# Patient Record
Sex: FEMALE | NOT HISPANIC OR LATINO | Employment: FULL TIME | URBAN - METROPOLITAN AREA
[De-identification: names, ages, dates, MRNs, and addresses within clinical notes are randomized per-mention and may not be internally consistent; named-entity substitution may affect disease eponyms.]

---

## 2023-10-27 ENCOUNTER — APPOINTMENT (OUTPATIENT)
Dept: RADIOLOGY | Facility: MEDICAL CENTER | Age: 53
DRG: 660 | End: 2023-10-27
Attending: UROLOGY
Payer: COMMERCIAL

## 2023-10-27 ENCOUNTER — HOSPITAL ENCOUNTER (OUTPATIENT)
Dept: RADIOLOGY | Facility: MEDICAL CENTER | Age: 53
End: 2023-10-27
Payer: COMMERCIAL

## 2023-10-27 ENCOUNTER — ANESTHESIA EVENT (OUTPATIENT)
Dept: SURGERY | Facility: MEDICAL CENTER | Age: 53
DRG: 660 | End: 2023-10-27
Payer: COMMERCIAL

## 2023-10-27 ENCOUNTER — ANESTHESIA (OUTPATIENT)
Dept: SURGERY | Facility: MEDICAL CENTER | Age: 53
DRG: 660 | End: 2023-10-27
Payer: COMMERCIAL

## 2023-10-27 ENCOUNTER — HOSPITAL ENCOUNTER (INPATIENT)
Facility: MEDICAL CENTER | Age: 53
LOS: 2 days | DRG: 660 | End: 2023-10-29
Attending: STUDENT IN AN ORGANIZED HEALTH CARE EDUCATION/TRAINING PROGRAM | Admitting: STUDENT IN AN ORGANIZED HEALTH CARE EDUCATION/TRAINING PROGRAM
Payer: COMMERCIAL

## 2023-10-27 ENCOUNTER — APPOINTMENT (OUTPATIENT)
Dept: RADIOLOGY | Facility: MEDICAL CENTER | Age: 53
End: 2023-10-27
Payer: COMMERCIAL

## 2023-10-27 DIAGNOSIS — R78.81 BACTEREMIA: ICD-10-CM

## 2023-10-27 PROBLEM — N13.30 HYDRONEPHROSIS: Status: ACTIVE | Noted: 2023-10-27

## 2023-10-27 PROBLEM — N17.9 ACUTE RENAL FAILURE (ARF) (HCC): Status: ACTIVE | Noted: 2023-10-27

## 2023-10-27 LAB
ALBUMIN SERPL BCP-MCNC: 2.1 G/DL (ref 3.2–4.9)
ALBUMIN/GLOB SERPL: 0.6 G/DL
ALP SERPL-CCNC: 302 U/L (ref 30–99)
ALT SERPL-CCNC: 21 U/L (ref 2–50)
ANION GAP SERPL CALC-SCNC: 10 MMOL/L (ref 7–16)
ANISOCYTOSIS BLD QL SMEAR: ABNORMAL
APPEARANCE UR: CLEAR
AST SERPL-CCNC: 30 U/L (ref 12–45)
BACTERIA #/AREA URNS HPF: NEGATIVE /HPF
BASOPHILS # BLD AUTO: 0 % (ref 0–1.8)
BASOPHILS # BLD: 0 K/UL (ref 0–0.12)
BILIRUB SERPL-MCNC: 2.6 MG/DL (ref 0.1–1.5)
BILIRUB UR QL STRIP.AUTO: NEGATIVE
BUN SERPL-MCNC: 16 MG/DL (ref 8–22)
CALCIUM ALBUM COR SERPL-MCNC: 9.6 MG/DL (ref 8.5–10.5)
CALCIUM SERPL-MCNC: 8.1 MG/DL (ref 8.5–10.5)
CHLORIDE SERPL-SCNC: 107 MMOL/L (ref 96–112)
CO2 SERPL-SCNC: 18 MMOL/L (ref 20–33)
COLOR UR: ABNORMAL
CREAT SERPL-MCNC: 1.54 MG/DL (ref 0.5–1.4)
EOSINOPHIL # BLD AUTO: 0 K/UL (ref 0–0.51)
EOSINOPHIL NFR BLD: 0 % (ref 0–6.9)
EPI CELLS #/AREA URNS HPF: NEGATIVE /HPF
ERYTHROCYTE [DISTWIDTH] IN BLOOD BY AUTOMATED COUNT: 47.8 FL (ref 35.9–50)
GFR SERPLBLD CREATININE-BSD FMLA CKD-EPI: 40 ML/MIN/1.73 M 2
GLOBULIN SER CALC-MCNC: 3.7 G/DL (ref 1.9–3.5)
GLUCOSE SERPL-MCNC: 111 MG/DL (ref 65–99)
GLUCOSE UR STRIP.AUTO-MCNC: NEGATIVE MG/DL
HCT VFR BLD AUTO: 29.3 % (ref 37–47)
HGB BLD-MCNC: 9.7 G/DL (ref 12–16)
HYALINE CASTS #/AREA URNS LPF: ABNORMAL /LPF
KETONES UR STRIP.AUTO-MCNC: NEGATIVE MG/DL
LEUKOCYTE ESTERASE UR QL STRIP.AUTO: ABNORMAL
LYMPHOCYTES # BLD AUTO: 2.1 K/UL (ref 1–4.8)
LYMPHOCYTES NFR BLD: 13.9 % (ref 22–41)
MANUAL DIFF BLD: NORMAL
MCH RBC QN AUTO: 27.4 PG (ref 27–33)
MCHC RBC AUTO-ENTMCNC: 33.1 G/DL (ref 32.2–35.5)
MCV RBC AUTO: 82.8 FL (ref 81.4–97.8)
METAMYELOCYTES NFR BLD MANUAL: 3.3 %
MICRO URNS: ABNORMAL
MONOCYTES # BLD AUTO: 0.12 K/UL (ref 0–0.85)
MONOCYTES NFR BLD AUTO: 0.8 % (ref 0–13.4)
MORPHOLOGY BLD-IMP: NORMAL
MYELOCYTES NFR BLD MANUAL: 3.3 %
NEUTROPHILS # BLD AUTO: 11.76 K/UL (ref 1.82–7.42)
NEUTROPHILS NFR BLD: 73.8 % (ref 44–72)
NEUTS BAND NFR BLD MANUAL: 4.1 % (ref 0–10)
NITRITE UR QL STRIP.AUTO: NEGATIVE
NRBC # BLD AUTO: 0 K/UL
NRBC BLD-RTO: 0 /100 WBC (ref 0–0.2)
PH UR STRIP.AUTO: 6 [PH] (ref 5–8)
PLATELET # BLD AUTO: 178 K/UL (ref 164–446)
PLATELET BLD QL SMEAR: NORMAL
PMV BLD AUTO: 9.4 FL (ref 9–12.9)
POTASSIUM SERPL-SCNC: 3.4 MMOL/L (ref 3.6–5.5)
PROMYELOCYTES NFR BLD MANUAL: 0.8 %
PROT SERPL-MCNC: 5.8 G/DL (ref 6–8.2)
PROT UR QL STRIP: 30 MG/DL
RBC # BLD AUTO: 3.54 M/UL (ref 4.2–5.4)
RBC # URNS HPF: >150 /HPF
RBC BLD AUTO: PRESENT
RBC UR QL AUTO: ABNORMAL
SODIUM SERPL-SCNC: 135 MMOL/L (ref 135–145)
SP GR UR STRIP.AUTO: 1.01
TOXIC GRANULES BLD QL SMEAR: NORMAL
UROBILINOGEN UR STRIP.AUTO-MCNC: 1 MG/DL
WBC # BLD AUTO: 15.1 K/UL (ref 4.8–10.8)
WBC #/AREA URNS HPF: ABNORMAL /HPF

## 2023-10-27 PROCEDURE — 0T768DZ DILATION OF RIGHT URETER WITH INTRALUMINAL DEVICE, VIA NATURAL OR ARTIFICIAL OPENING ENDOSCOPIC: ICD-10-PCS | Performed by: ANESTHESIOLOGY

## 2023-10-27 PROCEDURE — 160035 HCHG PACU - 1ST 60 MINS PHASE I: Performed by: UROLOGY

## 2023-10-27 PROCEDURE — 160028 HCHG SURGERY MINUTES - 1ST 30 MINS LEVEL 3: Performed by: UROLOGY

## 2023-10-27 PROCEDURE — 99222 1ST HOSP IP/OBS MODERATE 55: CPT | Performed by: STUDENT IN AN ORGANIZED HEALTH CARE EDUCATION/TRAINING PROGRAM

## 2023-10-27 PROCEDURE — 160039 HCHG SURGERY MINUTES - EA ADDL 1 MIN LEVEL 3: Performed by: UROLOGY

## 2023-10-27 PROCEDURE — 700105 HCHG RX REV CODE 258: Performed by: ANESTHESIOLOGY

## 2023-10-27 PROCEDURE — C2617 STENT, NON-COR, TEM W/O DEL: HCPCS | Performed by: UROLOGY

## 2023-10-27 PROCEDURE — 160002 HCHG RECOVERY MINUTES (STAT): Performed by: UROLOGY

## 2023-10-27 PROCEDURE — 160048 HCHG OR STATISTICAL LEVEL 1-5: Performed by: UROLOGY

## 2023-10-27 PROCEDURE — 770006 HCHG ROOM/CARE - MED/SURG/GYN SEMI*

## 2023-10-27 PROCEDURE — 87086 URINE CULTURE/COLONY COUNT: CPT

## 2023-10-27 PROCEDURE — 700102 HCHG RX REV CODE 250 W/ 637 OVERRIDE(OP): Performed by: STUDENT IN AN ORGANIZED HEALTH CARE EDUCATION/TRAINING PROGRAM

## 2023-10-27 PROCEDURE — 700111 HCHG RX REV CODE 636 W/ 250 OVERRIDE (IP): Mod: JZ | Performed by: ANESTHESIOLOGY

## 2023-10-27 PROCEDURE — 160009 HCHG ANES TIME/MIN: Performed by: UROLOGY

## 2023-10-27 PROCEDURE — 87077 CULTURE AEROBIC IDENTIFY: CPT

## 2023-10-27 PROCEDURE — 87040 BLOOD CULTURE FOR BACTERIA: CPT | Mod: 91

## 2023-10-27 PROCEDURE — 700101 HCHG RX REV CODE 250: Performed by: ANESTHESIOLOGY

## 2023-10-27 PROCEDURE — 700117 HCHG RX CONTRAST REV CODE 255: Performed by: UROLOGY

## 2023-10-27 PROCEDURE — 87150 DNA/RNA AMPLIFIED PROBE: CPT | Mod: 91

## 2023-10-27 PROCEDURE — C1769 GUIDE WIRE: HCPCS | Performed by: UROLOGY

## 2023-10-27 PROCEDURE — 36415 COLL VENOUS BLD VENIPUNCTURE: CPT

## 2023-10-27 PROCEDURE — 85007 BL SMEAR W/DIFF WBC COUNT: CPT

## 2023-10-27 PROCEDURE — 81001 URINALYSIS AUTO W/SCOPE: CPT

## 2023-10-27 PROCEDURE — 85027 COMPLETE CBC AUTOMATED: CPT

## 2023-10-27 PROCEDURE — A9270 NON-COVERED ITEM OR SERVICE: HCPCS | Performed by: STUDENT IN AN ORGANIZED HEALTH CARE EDUCATION/TRAINING PROGRAM

## 2023-10-27 PROCEDURE — 80053 COMPREHEN METABOLIC PANEL: CPT

## 2023-10-27 DEVICE — STENT UROLOGICAL POLARIS 6X26  ULTRA: Type: IMPLANTABLE DEVICE | Site: URETER | Status: FUNCTIONAL

## 2023-10-27 RX ORDER — ACETAMINOPHEN 325 MG/1
650 TABLET ORAL EVERY 6 HOURS
Status: DISCONTINUED | OUTPATIENT
Start: 2023-10-27 | End: 2023-10-29 | Stop reason: HOSPADM

## 2023-10-27 RX ORDER — ACETAMINOPHEN 325 MG/1
650 TABLET ORAL EVERY 6 HOURS PRN
Status: DISCONTINUED | OUTPATIENT
Start: 2023-11-01 | End: 2023-10-29 | Stop reason: HOSPADM

## 2023-10-27 RX ORDER — DIPHENHYDRAMINE HYDROCHLORIDE 50 MG/ML
12.5 INJECTION INTRAMUSCULAR; INTRAVENOUS
Status: DISCONTINUED | OUTPATIENT
Start: 2023-10-27 | End: 2023-10-27 | Stop reason: HOSPADM

## 2023-10-27 RX ORDER — HYDROMORPHONE HYDROCHLORIDE 1 MG/ML
0.1 INJECTION, SOLUTION INTRAMUSCULAR; INTRAVENOUS; SUBCUTANEOUS
Status: DISCONTINUED | OUTPATIENT
Start: 2023-10-27 | End: 2023-10-27 | Stop reason: HOSPADM

## 2023-10-27 RX ORDER — OXYCODONE HYDROCHLORIDE 5 MG/1
5 TABLET ORAL
Status: DISCONTINUED | OUTPATIENT
Start: 2023-10-27 | End: 2023-10-29 | Stop reason: HOSPADM

## 2023-10-27 RX ORDER — LIDOCAINE HYDROCHLORIDE 20 MG/ML
INJECTION, SOLUTION EPIDURAL; INFILTRATION; INTRACAUDAL; PERINEURAL PRN
Status: DISCONTINUED | OUTPATIENT
Start: 2023-10-27 | End: 2023-10-27 | Stop reason: SURG

## 2023-10-27 RX ORDER — SODIUM CHLORIDE, SODIUM LACTATE, POTASSIUM CHLORIDE, CALCIUM CHLORIDE 600; 310; 30; 20 MG/100ML; MG/100ML; MG/100ML; MG/100ML
INJECTION, SOLUTION INTRAVENOUS CONTINUOUS
Status: DISCONTINUED | OUTPATIENT
Start: 2023-10-27 | End: 2023-10-27 | Stop reason: HOSPADM

## 2023-10-27 RX ORDER — SODIUM CHLORIDE, SODIUM LACTATE, POTASSIUM CHLORIDE, CALCIUM CHLORIDE 600; 310; 30; 20 MG/100ML; MG/100ML; MG/100ML; MG/100ML
INJECTION, SOLUTION INTRAVENOUS
Status: DISCONTINUED | OUTPATIENT
Start: 2023-10-27 | End: 2023-10-27 | Stop reason: SURG

## 2023-10-27 RX ORDER — HYDROMORPHONE HYDROCHLORIDE 1 MG/ML
0.5 INJECTION, SOLUTION INTRAMUSCULAR; INTRAVENOUS; SUBCUTANEOUS
Status: DISCONTINUED | OUTPATIENT
Start: 2023-10-27 | End: 2023-10-29 | Stop reason: HOSPADM

## 2023-10-27 RX ORDER — IPRATROPIUM BROMIDE AND ALBUTEROL SULFATE 2.5; .5 MG/3ML; MG/3ML
3 SOLUTION RESPIRATORY (INHALATION)
Status: DISCONTINUED | OUTPATIENT
Start: 2023-10-27 | End: 2023-10-27 | Stop reason: HOSPADM

## 2023-10-27 RX ORDER — ONDANSETRON 2 MG/ML
INJECTION INTRAMUSCULAR; INTRAVENOUS PRN
Status: DISCONTINUED | OUTPATIENT
Start: 2023-10-27 | End: 2023-10-27 | Stop reason: SURG

## 2023-10-27 RX ORDER — MIDAZOLAM HYDROCHLORIDE 1 MG/ML
1 INJECTION INTRAMUSCULAR; INTRAVENOUS
Status: DISCONTINUED | OUTPATIENT
Start: 2023-10-27 | End: 2023-10-27 | Stop reason: HOSPADM

## 2023-10-27 RX ORDER — HYDROMORPHONE HYDROCHLORIDE 1 MG/ML
0.5 INJECTION, SOLUTION INTRAMUSCULAR; INTRAVENOUS; SUBCUTANEOUS
Status: DISCONTINUED | OUTPATIENT
Start: 2023-10-27 | End: 2023-10-27 | Stop reason: HOSPADM

## 2023-10-27 RX ORDER — DEXAMETHASONE SODIUM PHOSPHATE 4 MG/ML
INJECTION, SOLUTION INTRA-ARTICULAR; INTRALESIONAL; INTRAMUSCULAR; INTRAVENOUS; SOFT TISSUE PRN
Status: DISCONTINUED | OUTPATIENT
Start: 2023-10-27 | End: 2023-10-27 | Stop reason: SURG

## 2023-10-27 RX ORDER — OXYCODONE HCL 5 MG/5 ML
5 SOLUTION, ORAL ORAL
Status: DISCONTINUED | OUTPATIENT
Start: 2023-10-27 | End: 2023-10-27 | Stop reason: HOSPADM

## 2023-10-27 RX ORDER — OXYCODONE HCL 5 MG/5 ML
10 SOLUTION, ORAL ORAL
Status: DISCONTINUED | OUTPATIENT
Start: 2023-10-27 | End: 2023-10-27 | Stop reason: HOSPADM

## 2023-10-27 RX ORDER — ONDANSETRON 2 MG/ML
4 INJECTION INTRAMUSCULAR; INTRAVENOUS
Status: DISCONTINUED | OUTPATIENT
Start: 2023-10-27 | End: 2023-10-27 | Stop reason: HOSPADM

## 2023-10-27 RX ORDER — OXYCODONE HYDROCHLORIDE 10 MG/1
10 TABLET ORAL
Status: DISCONTINUED | OUTPATIENT
Start: 2023-10-27 | End: 2023-10-29 | Stop reason: HOSPADM

## 2023-10-27 RX ORDER — MEPERIDINE HYDROCHLORIDE 25 MG/ML
25 INJECTION INTRAMUSCULAR; INTRAVENOUS; SUBCUTANEOUS
Status: DISCONTINUED | OUTPATIENT
Start: 2023-10-27 | End: 2023-10-27 | Stop reason: HOSPADM

## 2023-10-27 RX ORDER — HYDROMORPHONE HYDROCHLORIDE 1 MG/ML
0.2 INJECTION, SOLUTION INTRAMUSCULAR; INTRAVENOUS; SUBCUTANEOUS
Status: DISCONTINUED | OUTPATIENT
Start: 2023-10-27 | End: 2023-10-27 | Stop reason: HOSPADM

## 2023-10-27 RX ORDER — PHENYLEPHRINE HYDROCHLORIDE 10 MG/ML
INJECTION, SOLUTION INTRAMUSCULAR; INTRAVENOUS; SUBCUTANEOUS PRN
Status: DISCONTINUED | OUTPATIENT
Start: 2023-10-27 | End: 2023-10-27 | Stop reason: SURG

## 2023-10-27 RX ADMIN — SUGAMMADEX 200 MG: 100 INJECTION, SOLUTION INTRAVENOUS at 17:55

## 2023-10-27 RX ADMIN — FENTANYL CITRATE 50 MCG: 50 INJECTION, SOLUTION INTRAMUSCULAR; INTRAVENOUS at 17:30

## 2023-10-27 RX ADMIN — PROPOFOL 150 MG: 10 INJECTION, EMULSION INTRAVENOUS at 17:13

## 2023-10-27 RX ADMIN — MIDAZOLAM HYDROCHLORIDE 2 MG: 2 INJECTION, SOLUTION INTRAMUSCULAR; INTRAVENOUS at 17:08

## 2023-10-27 RX ADMIN — ROCURONIUM BROMIDE 50 MG: 10 INJECTION, SOLUTION INTRAVENOUS at 17:13

## 2023-10-27 RX ADMIN — LIDOCAINE HYDROCHLORIDE 40 MG: 20 INJECTION, SOLUTION EPIDURAL; INFILTRATION; INTRACAUDAL at 17:13

## 2023-10-27 RX ADMIN — PHENYLEPHRINE HYDROCHLORIDE 200 MCG: 10 INJECTION INTRAVENOUS at 17:37

## 2023-10-27 RX ADMIN — FENTANYL CITRATE 50 MCG: 50 INJECTION, SOLUTION INTRAMUSCULAR; INTRAVENOUS at 17:13

## 2023-10-27 RX ADMIN — SODIUM CHLORIDE, POTASSIUM CHLORIDE, SODIUM LACTATE AND CALCIUM CHLORIDE: 600; 310; 30; 20 INJECTION, SOLUTION INTRAVENOUS at 17:08

## 2023-10-27 RX ADMIN — ONDANSETRON 4 MG: 2 INJECTION INTRAMUSCULAR; INTRAVENOUS at 17:23

## 2023-10-27 RX ADMIN — OXYCODONE HYDROCHLORIDE 10 MG: 10 TABLET ORAL at 15:34

## 2023-10-27 RX ADMIN — DEXAMETHASONE SODIUM PHOSPHATE 8 MG: 4 INJECTION INTRA-ARTICULAR; INTRALESIONAL; INTRAMUSCULAR; INTRAVENOUS; SOFT TISSUE at 17:23

## 2023-10-27 ASSESSMENT — ENCOUNTER SYMPTOMS
HEADACHES: 0
SORE THROAT: 0
NECK PAIN: 0
CHILLS: 0
COUGH: 0
DIARRHEA: 1
PALPITATIONS: 0
SHORTNESS OF BREATH: 0
MYALGIAS: 0
DOUBLE VISION: 0
HEARTBURN: 0
DIZZINESS: 0
NAUSEA: 0
FLANK PAIN: 1
BLURRED VISION: 0
FEVER: 0

## 2023-10-27 ASSESSMENT — PAIN DESCRIPTION - PAIN TYPE
TYPE: ACUTE PAIN

## 2023-10-27 ASSESSMENT — PAIN SCALES - GENERAL: PAIN_LEVEL: 3

## 2023-10-27 NOTE — PROGRESS NOTES
[facility] HOSPITAL[facility] TRIAGE OFFICER DIRECT ADMISSION REPORT  Transferring facility: [facility]  Transferring physician: Dr. Paulino  Chief complaint: Right flank pain  Pertinent history & patient course: Otherwise healthy 54 y/o woman p/w RUQ/R flank pain 4 days ago. Found to have AYAKA with creatinine 3.6.  Admitted to outside hospital, treated with IV fluids and started on Zosyn.  CT of the abdomen showed right-sided kidney stone measuring 5 x 7 mm with hydronephrosis, as well as questionable cholecystitis.  She was evaluated by surgery and had a negative MRCP, finding of possible cholecystitis felt to be incidental.  Initially improved, creatinine improved to 1.7.  However subsequently began having increasing right flank pain and increasing leukocytosis with WBC increasing from 9 to 13-15.  Abdomen was reimaged initially by ultrasound which did not show any stones or hydronephrosis, then subsequently had a CT scan which showed right hydronephrosis with kidney stone measuring 6 x 8 x 4 mm.  Blood cultures were positive for Klebsiella, Zosyn was de-escalated to ceftriaxone.  Transfer requested for urology needs.    Code Status: Full code  Further work up or recommendations per triage officer prior to transfer: None  Consultants called prior to transfer and pertinent input from consultants: None  Patient accepted for transfer: Yes  Consultants to be called upon arrival: Urology  Admission status: Inpatient.   Floor requested: Surgery  If ICU transfer, name of intensivist case discussed with and pertinent input from critical care: N/A     Please inform the triage officer upon arrival of the patient to [facility] for assignment of a hospitalist to perform admission.      For any question or concerns regarding the care of this patient, please reach out to the assigned hospita

## 2023-10-27 NOTE — H&P
Hospital Medicine History & Physical Note    Date of Service  10/27/2023    Primary Care Physician  No primary care provider on file.    Consultants  infectious disease and urology    Specialist Names: Dr. Tabares, Dr. Matos    Code Status  Full Code    Chief Complaint  No chief complaint on file.      History of Presenting Illness  Ingrid Bartlett is a 53 y.o. female who presented 10/27/2023 with flank pain, kidney stone. Otherwise healthy 52 y/o woman p/w RUQ/R flank pain 4 days ago. Found to have AYAKA with creatinine 3.6.  Admitted to outside hospital, treated with IV fluids and started on Zosyn.  CT of the abdomen showed right-sided kidney stone measuring 5 x 7 mm with hydronephrosis, as well as questionable cholecystitis.  She was evaluated by surgery and had a negative MRCP, finding of possible cholecystitis felt to be incidental.  Initially improved, creatinine improved to 1.7.  However subsequently began having increasing right flank pain and increasing leukocytosis with WBC increasing from 9 to 13-15.  Abdomen was reimaged initially by ultrasound which did not show any stones or hydronephrosis, then subsequently had a CT scan which showed right hydronephrosis with kidney stone measuring 6 x 8 x 4 mm.  Blood cultures were positive for Klebsiella, Zosyn was de-escalated to ceftriaxone.  Transfer requested for urology needs.    Labs reviewed from outside hospital:  27  WBC 15.8  Hemoglobin 9.2  Glucose 100  BUN 20  Creatinine 1.7  Sodium 136  Potassium 3.3  Bicarb 18    I discussed the plan of care with patient.    I consulted urology and infectious disease.    Review of Systems  Review of Systems   Constitutional:  Positive for malaise/fatigue. Negative for chills and fever.   HENT:  Negative for congestion and sore throat.    Eyes:  Negative for blurred vision and double vision.   Respiratory:  Negative for cough and shortness of breath.    Cardiovascular:  Negative for chest pain and palpitations.    Gastrointestinal:  Positive for diarrhea. Negative for heartburn and nausea.   Genitourinary:  Positive for flank pain.   Musculoskeletal:  Negative for myalgias and neck pain.   Neurological:  Negative for dizziness and headaches.       Past Medical History   has no past medical history on file.    Surgical History   has no past surgical history on file.     Family History  family history is not on file.   Family history reviewed with patient. There is no family history that is pertinent to the chief complaint.     Social History       Allergies  No Known Allergies    Medications  None       Physical Exam  Temp:  [36.8 °C (98.3 °F)-37.2 °C (98.9 °F)] 37.1 °C (98.7 °F)  Pulse:  [84-87] 87  Resp:  [16-18] 16  BP: (146-160)/(81-85) 160/81  SpO2:  [94 %-95 %] 94 %                          Physical Exam  Constitutional:       General: She is not in acute distress.     Appearance: She is not toxic-appearing.   HENT:      Head: Normocephalic and atraumatic.      Nose: Nose normal.      Mouth/Throat:      Mouth: Mucous membranes are dry.      Pharynx: Oropharynx is clear.   Eyes:      Extraocular Movements: Extraocular movements intact.      Conjunctiva/sclera: Conjunctivae normal.   Cardiovascular:      Rate and Rhythm: Normal rate and regular rhythm.      Pulses: Normal pulses.      Heart sounds: Normal heart sounds.   Pulmonary:      Effort: Pulmonary effort is normal.      Breath sounds: Normal breath sounds.   Abdominal:      Palpations: Abdomen is soft.      Tenderness: There is right CVA tenderness.   Musculoskeletal:         General: No swelling or deformity.      Cervical back: Neck supple. No rigidity.   Skin:     General: Skin is warm and dry.   Neurological:      General: No focal deficit present.      Mental Status: She is oriented to person, place, and time.         Laboratory:  Recent Labs     10/27/23  1452   WBC 15.1*   RBC 3.54*   HEMOGLOBIN 9.7*   HEMATOCRIT 29.3*   MCV 82.8   MCH 27.4   MCHC 33.1  "  RDW 47.8   PLATELETCT 178   MPV 9.4     Recent Labs     10/27/23  1452   SODIUM 135   POTASSIUM 3.4*   CHLORIDE 107   CO2 18*   GLUCOSE 111*   BUN 16   CREATININE 1.54*   CALCIUM 8.1*     Recent Labs     10/27/23  1452   ALTSGPT 21   ASTSGOT 30   ALKPHOSPHAT 302*   TBILIRUBIN 2.6*   GLUCOSE 111*         No results for input(s): \"NTPROBNP\" in the last 72 hours.      No results for input(s): \"TROPONINT\" in the last 72 hours.    Imaging:  MR-FOREIGN FILM MRI   Final Result      DX-CYSTO FLUORO > 1 HOUR    (Results Pending)       Assessment/Plan:  Justification for Admission Status  I anticipate this patient will require at least two midnights for appropriate medical management, necessitating inpatient admission because infected kidney stones and bacteremia    Patient will need a Med/Surg bed on SURGICAL service .  The need is secondary to above.    * Hydronephrosis- (present on admission)  Assessment & Plan  With associated right-sided kidney stone and urinary tract infection  Urology consulted, Dr. Matos will see patient, likely plan for or today  Continue antibiotics  ID consulted for Klebsiella bacteremia      Acute renal failure (ARF) ([facility])  Assessment & Plan  In setting of nephrolithiasis, hydronephrosis UTI and bacteremia  I have ordered urinalysis  Improved since admission to outside hospital 4 days ago  Continue to monitor kidney function  Avoid nephrotoxins, renally dose all meds      Bacteremia  Assessment & Plan  Klebsiella growing on blood culture at outside hospital  Source is urine/kidney stone  Have redrawn blood cultures here  ID consulted, continue ceftriaxone        VTE prophylaxis: SCDs/TEDs  "

## 2023-10-27 NOTE — CONSULTS
"Urology consult note    Reason for consultation: right ureteral stone, sepsis    HPI:   Presented to [place] with right flank pain and fever. CT found to have right ureteral stone. Now also with Klebsiella bacteremia    O:   BP (!) 146/85   Pulse 86   Temp 37.2 °C (98.9 °F) (Temporal)   Resp 16   Ht 1.651 m (5' 5\")   Wt 85.8 kg (189 lb 2.5 oz)   SpO2 95%   Recent Labs     10/27/23  1452   SODIUM 135   POTASSIUM 3.4*   CHLORIDE 107   CO2 18*   GLUCOSE 111*   BUN 16   CREATININE 1.54*   CALCIUM 8.1*     Recent Labs     10/27/23  1452   WBC 15.1*   RBC 3.54*   HEMOGLOBIN 9.7*   HEMATOCRIT 29.3*   MCV 82.8   MCH 27.4   MCHC 33.1   RDW 47.8   PLATELETCT 178   MPV 9.4         Exam:   Awake, alert, mild distress   Abdomen soft, benign.   Urine: clear      A/P:    Active Hospital Problems    Diagnosis     Hydronephrosis [N13.30]        Gaurded   New recommendations: cystoscopy right ureteral stent. Once sepsis resolves, plan for ureteroscopy, laser lithotripsy 2-3 weeks      "

## 2023-10-27 NOTE — CARE PLAN
Problem: Knowledge Deficit - Standard  Goal: Patient and family/care givers will demonstrate understanding of plan of care, disease process/condition, diagnostic tests and medications  Outcome: Progressing   The patient is Stable - Low risk of patient condition declining or worsening    Shift Goals  Clinical Goals: surgery  Patient Goals: Surgery    Progress made toward(s) clinical / shift goals:  Patient to surgery.    Patient is not progressing towards the following goals:

## 2023-10-27 NOTE — ANESTHESIA PREPROCEDURE EVALUATION
Case: [id number] Date/Time: 10/27/23 1640    Procedure: CYSTOSCOPY, WITH URETERAL STENT INSERTION (Right)    Location: [facility] OR 18 / SURGERY [facility]    Surgeons: Rui Matos M.D.          Relevant Problems      (positive) Hydronephrosis       Physical Exam    Airway   Mallampati: II  TM distance: >3 FB  Neck ROM: full       Cardiovascular - normal exam  Rhythm: regular  Rate: normal  (-) murmur     Dental - normal exam           Pulmonary - normal exam  Breath sounds clear to auscultation     Abdominal    Neurological - normal exam                 Anesthesia Plan    ASA 2- EMERGENT       Plan - general       Airway plan will be ETT          Induction: intravenous    Postoperative Plan: Postoperative administration of opioids is intended.    Pertinent diagnostic labs and testing reviewed    Informed Consent:    Anesthetic plan and risks discussed with patient.    Use of blood products discussed with: patient whom consented to blood products.

## 2023-10-27 NOTE — ASSESSMENT & PLAN NOTE
With associated right-sided kidney stone and urinary tract infection  Urology consulted, s/p cystoscopy with ureteral stent placement on 10/27.   Urology rec outpatient CULTS in 2-3 weeks once infection has resolved.  Continue iv ceftriaxone     yes

## 2023-10-28 PROBLEM — E87.1 HYPONATREMIA: Status: ACTIVE | Noted: 2023-10-28

## 2023-10-28 LAB
ALBUMIN SERPL BCP-MCNC: 2.2 G/DL (ref 3.2–4.9)
ALBUMIN/GLOB SERPL: 0.6 G/DL
ALP SERPL-CCNC: 280 U/L (ref 30–99)
ALT SERPL-CCNC: 16 U/L (ref 2–50)
ANION GAP SERPL CALC-SCNC: 10 MMOL/L (ref 7–16)
AST SERPL-CCNC: 16 U/L (ref 12–45)
BILIRUB SERPL-MCNC: 1.5 MG/DL (ref 0.1–1.5)
BUN SERPL-MCNC: 21 MG/DL (ref 8–22)
CALCIUM ALBUM COR SERPL-MCNC: 9.5 MG/DL (ref 8.5–10.5)
CALCIUM SERPL-MCNC: 8.1 MG/DL (ref 8.5–10.5)
CHLORIDE SERPL-SCNC: 103 MMOL/L (ref 96–112)
CO2 SERPL-SCNC: 20 MMOL/L (ref 20–33)
CREAT SERPL-MCNC: 1.22 MG/DL (ref 0.5–1.4)
ERYTHROCYTE [DISTWIDTH] IN BLOOD BY AUTOMATED COUNT: 48 FL (ref 35.9–50)
GFR SERPLBLD CREATININE-BSD FMLA CKD-EPI: 52 ML/MIN/1.73 M 2
GLOBULIN SER CALC-MCNC: 3.7 G/DL (ref 1.9–3.5)
GLUCOSE SERPL-MCNC: 121 MG/DL (ref 65–99)
HCT VFR BLD AUTO: 29.3 % (ref 37–47)
HGB BLD-MCNC: 9.7 G/DL (ref 12–16)
MCH RBC QN AUTO: 27.3 PG (ref 27–33)
MCHC RBC AUTO-ENTMCNC: 33.1 G/DL (ref 32.2–35.5)
MCV RBC AUTO: 82.5 FL (ref 81.4–97.8)
PLATELET # BLD AUTO: 218 K/UL (ref 164–446)
PMV BLD AUTO: 9.6 FL (ref 9–12.9)
POTASSIUM SERPL-SCNC: 4 MMOL/L (ref 3.6–5.5)
PROT SERPL-MCNC: 5.9 G/DL (ref 6–8.2)
RBC # BLD AUTO: 3.55 M/UL (ref 4.2–5.4)
SODIUM SERPL-SCNC: 133 MMOL/L (ref 135–145)
WBC # BLD AUTO: 17.7 K/UL (ref 4.8–10.8)

## 2023-10-28 PROCEDURE — 36415 COLL VENOUS BLD VENIPUNCTURE: CPT

## 2023-10-28 PROCEDURE — A9270 NON-COVERED ITEM OR SERVICE: HCPCS | Performed by: STUDENT IN AN ORGANIZED HEALTH CARE EDUCATION/TRAINING PROGRAM

## 2023-10-28 PROCEDURE — 99223 1ST HOSP IP/OBS HIGH 75: CPT | Performed by: INTERNAL MEDICINE

## 2023-10-28 PROCEDURE — 80053 COMPREHEN METABOLIC PANEL: CPT

## 2023-10-28 PROCEDURE — 770006 HCHG ROOM/CARE - MED/SURG/GYN SEMI*

## 2023-10-28 PROCEDURE — 700111 HCHG RX REV CODE 636 W/ 250 OVERRIDE (IP): Performed by: STUDENT IN AN ORGANIZED HEALTH CARE EDUCATION/TRAINING PROGRAM

## 2023-10-28 PROCEDURE — 700102 HCHG RX REV CODE 250 W/ 637 OVERRIDE(OP): Performed by: STUDENT IN AN ORGANIZED HEALTH CARE EDUCATION/TRAINING PROGRAM

## 2023-10-28 PROCEDURE — 99233 SBSQ HOSP IP/OBS HIGH 50: CPT | Performed by: STUDENT IN AN ORGANIZED HEALTH CARE EDUCATION/TRAINING PROGRAM

## 2023-10-28 PROCEDURE — 85027 COMPLETE CBC AUTOMATED: CPT

## 2023-10-28 RX ORDER — AMOXICILLIN 250 MG
2 CAPSULE ORAL 2 TIMES DAILY
Status: DISCONTINUED | OUTPATIENT
Start: 2023-10-28 | End: 2023-10-29 | Stop reason: HOSPADM

## 2023-10-28 RX ORDER — POLYETHYLENE GLYCOL 3350 17 G/17G
1 POWDER, FOR SOLUTION ORAL
Status: DISCONTINUED | OUTPATIENT
Start: 2023-10-28 | End: 2023-10-29 | Stop reason: HOSPADM

## 2023-10-28 RX ORDER — BISACODYL 10 MG
10 SUPPOSITORY, RECTAL RECTAL
Status: DISCONTINUED | OUTPATIENT
Start: 2023-10-28 | End: 2023-10-29 | Stop reason: HOSPADM

## 2023-10-28 RX ADMIN — CEFTRIAXONE SODIUM 2000 MG: 10 INJECTION, POWDER, FOR SOLUTION INTRAVENOUS at 05:15

## 2023-10-28 RX ADMIN — ACETAMINOPHEN 650 MG: 325 TABLET, FILM COATED ORAL at 18:05

## 2023-10-28 RX ADMIN — ACETAMINOPHEN 650 MG: 325 TABLET, FILM COATED ORAL at 23:26

## 2023-10-28 ASSESSMENT — ENCOUNTER SYMPTOMS
CHILLS: 0
NAUSEA: 0
NERVOUS/ANXIOUS: 0
FEVER: 0
ABDOMINAL PAIN: 0
DIARRHEA: 0
SHORTNESS OF BREATH: 0
MYALGIAS: 0
SPUTUM PRODUCTION: 0
VOMITING: 0
FOCAL WEAKNESS: 0
BLURRED VISION: 0
COUGH: 0
FLANK PAIN: 0
CONSTIPATION: 0

## 2023-10-28 ASSESSMENT — PAIN DESCRIPTION - PAIN TYPE
TYPE: ACUTE PAIN

## 2023-10-28 ASSESSMENT — FIBROSIS 4 INDEX: FIB4 SCORE: 1.01

## 2023-10-28 NOTE — PROGRESS NOTES
lab from Lab called with critical result of blood cultures:update on pcr: mrsa and staph aureus negative at 1135. Critical lab result read back to lab.   Dr. vincent notified of critical lab result at 1138.  Critical lab result read back by Dr. vincent.

## 2023-10-28 NOTE — ANESTHESIA TIME REPORT
Anesthesia Start and Stop Event Times     Date Time Event    10/27/2023 1621 Ready for Procedure     1708 Anesthesia Start     1809 Anesthesia Stop        Responsible Staff  10/27/23    Name Role Begin End    Ernesto Singh M.D. Anesth 1708 1809        Overtime Reason:  no overtime (within assigned shift)    Comments:

## 2023-10-28 NOTE — ASSESSMENT & PLAN NOTE
In setting of nephrolithiasis, hydronephrosis UTI and bacteremia  I have ordered urinalysis  Improved since admission to outside hospital 4 days ago  Continue to monitor kidney function  Avoid nephrotoxins, renally dose all meds  Improving

## 2023-10-28 NOTE — ANESTHESIA PROCEDURE NOTES
Airway    Date/Time: 10/27/2023 5:34 PM    Performed by: Ernesto Singh M.D.  Authorized by: Ernesto Singh M.D.    Location:  OR  Urgency:  Elective  Indications for Airway Management:  Anesthesia      Spontaneous Ventilation: absent    Sedation Level:  Deep  Preoxygenated: Yes    Final Airway Type:  Supraglottic airway  Final Supraglottic Airway:  Standard LMA    SGA Size:  3  Number of Attempts at Approach:  1

## 2023-10-28 NOTE — ASSESSMENT & PLAN NOTE
Klebsiella growing on blood culture at outside hospital  Source is urine/kidney stone  Have redrawn blood cultures here 1/2 positive for G+cocci  ID consulted, continue iv ceftriaxone

## 2023-10-28 NOTE — ANESTHESIA POSTPROCEDURE EVALUATION
Patient: Ingrid Bartlett    Procedure Summary     Date: 10/27/23 Room / Location: [place] [id number] / SURGERY [facility]    Anesthesia Start: 1708 Anesthesia Stop: 1809    Procedure: CYSTOSCOPY, WITH URETERAL STENT INSERTION (Right: Ureter) Diagnosis: (right ureteral stone, sepsis)    Surgeons: Rui Matos M.D. Responsible Provider: Ernesto Singh M.D.    Anesthesia Type: general ASA Status: 2 - Emergent          Final Anesthesia Type: general  Last vitals  BP   Blood Pressure: 138/77    Temp   37.1 °C (98.7 °F)    Pulse   92   Resp   16    SpO2   90 %      Anesthesia Post Evaluation    Patient location during evaluation: PACU  Patient participation: complete - patient participated  Level of consciousness: awake and alert  Pain score: 3    Airway patency: patent  Anesthetic complications: no  Cardiovascular status: hemodynamically stable  Respiratory status: acceptable  Hydration status: euvolemic    PONV: none          No notable events documented.

## 2023-10-28 NOTE — PROGRESS NOTES
"Urology Progress Note    Post op Day # 1 ureteral stent    Overnight Events: None    S: No fevers, chills, nausea or vomiting. Feeling much better today.    O:   /71   Pulse (!) 58   Temp 36 °C (96.8 °F) (Temporal)   Resp 16   Ht 1.651 m (5' 5\")   Wt 85.8 kg (189 lb 2.5 oz)   SpO2 95%   Recent Labs     10/27/23  1452 10/28/23  0843   SODIUM 135 133*   POTASSIUM 3.4* 4.0   CHLORIDE 107 103   CO2 18* 20   GLUCOSE 111* 121*   BUN 16 21   CREATININE 1.54* 1.22   CALCIUM 8.1* 8.1*     Recent Labs     10/27/23  1452 10/28/23  0843   WBC 15.1* 17.7*   RBC 3.54* 3.55*   HEMOGLOBIN 9.7* 9.7*   HEMATOCRIT 29.3* 29.3*   MCV 82.8 82.5   MCH 27.4 27.3   MCHC 33.1 33.1   RDW 47.8 48.0   PLATELETCT 178 218   MPV 9.4 9.6         Intake/Output Summary (Last 24 hours) at 10/28/2023 1145  Last data filed at 10/28/2023 1120  Gross per 24 hour   Intake 1020 ml   Output --   Net 1020 ml       Exam:  Abdomen soft, benign.     A/P:    Active Hospital Problems    Diagnosis     Hydronephrosis [N13.30]     Bacteremia [R78.81]     Acute renal failure (ARF) (HCC) [N17.9]        - She will need 2 weeks of abx to treat her infection   - We will arrange outpatient CULTS in 2-3 weeks once infection has resolved  - Nothing further from  standpoint, we will sign off. Call with questions.    "

## 2023-10-28 NOTE — CARE PLAN
The patient is Stable - Low risk of patient condition declining or worsening    Shift Goals  Clinical Goals: post op vitals, pain control  Patient Goals: rest      Progress made toward(s) clinical / shift goals:  Pt declined Tylenol PO per MAR. Denies pain at this time.   Quiet and dark area provided to aid in sleep.      Problem: Knowledge Deficit - Standard  Goal: Patient and family/care givers will demonstrate understanding of plan of care, disease process/condition, diagnostic tests and medications  Outcome: Progressing       Patient is not progressing towards the following goals:

## 2023-10-28 NOTE — PROGRESS NOTES
Assumed care of pt from HS RN. Pt is sitting up in bed alert and oriented x4. Oxygen in place via NC. Denies pain. Call light in reach and bed low and locked.

## 2023-10-28 NOTE — PROGRESS NOTES
Assumed pt care with RN. Pt transferred back to unit via bed on 3L oxygen. Pt is A&OX4 and states she is in 0/10 pain but declines interventions at this time. Post-op vitals started. Plan of care discussed, no further questions. Personal belongings and call light within reach.

## 2023-10-28 NOTE — PROGRESS NOTES
lab from Lab called with critical result of 1 of 2 blood cultures gram positive cocci in clusters at 1002. Critical lab result read back to lab.   Dr. Bettina Ramos notified of critical lab result at 1018.  Critical lab result read back by Dr. Bettina Ramos.

## 2023-10-28 NOTE — OP REPORT
OPERATIVE NOTE:      Date: 10/27/2023    Patient ID:   Name:             Ingrid Bartlett     YOB: 1970  Age:                 53 y.o.  female   MRN:               [id number]                                                                         PRE-OP DIAGNOSIS: 8mm right ureteral stone        POST-OP DIAGNOSIS: same            PROCEDURE: Procedure(s) and Anesthesia Type:     * CYSTOSCOPY, WITH URETERAL STENT INSERTION - General            SURGEON: Surgeon(s) and Role:     * Rui Matos M.D. - Primary     ASSIST: none            ANESTHESIA: get            ESTIMATED BLOOD LOSS: none            DRAINS: 26cm 6F stent                  SPECIMENS: none               COMPLICATIONS: none                   CONDITION: stable            TECHNIQUE: Patient is brought to the operating room. Given general anesthesia. Placed in lithotomy position. Prepped and draped in sterile fashion. Cystoscopy is performed. Normal urethra and bladder are identified. Right ureteral orifice is identified. Orifice is cannulated and contrast used to opacify the collecting system. 8mm Stone seen at L3. No significant hydronephrosis. A guide wire is navigated to the renal pelvis. Over this a 26 cm, 6 Khmer double pigtail is passed. It coils to follow the contour into the lower calyx. Could not get it to make a coil in the pelvis. Clear urine effluxes from the bladder coil. This completes the procedure. Patient is sent to recovery in stable condition.

## 2023-10-28 NOTE — PROGRESS NOTES
Hospital Medicine Daily Progress Note    Date of Service  10/28/2023    Chief Complaint  Ingrid Bartlett is a 53 y.o. female admitted 10/27/2023 with flank pain    Hospital Course  53 y.o. female who presented 10/27/2023 with flank pain, kidney stone. Patient was transferred from outside hospital. She was noted AYAKA and CT abdomen showed right hydronephrosis with kidney stone with questionable cholecystitis. MRCP negative. Blood culture positive for Klebsiella. She is transferred here for urology evaluation.   Urology was consulted, s/p cystoscopy with ureteral stent placement on 10/27. Urology rec outpatient CULTS in 2-3 weeks once infection has resolved.  ID was consulted for bacteremia    Interval Problem Update  Seen patient at bedside  S/p ureteral stent placement  Noted blood culture positive for G+ cocci  Continue iv ceftriaxone  Discussed with urology, outpatient follow up once infection resolved  ID was consulted    I have discussed this patient's plan of care and discharge plan at IDT rounds today with Case Management, Nursing, Nursing leadership, and other members of the IDT team.    Consultants/Specialty  infectious disease and urology    Code Status  Full Code    Disposition  The patient is not medically cleared for discharge to home or a post-acute facility.      I have placed the appropriate orders for post-discharge needs.    Review of Systems  Review of Systems   Gastrointestinal:  Negative for abdominal pain.   All other systems reviewed and are negative.       Physical Exam  Temp:  [35.6 °C (96 °F)-37.2 °C (98.9 °F)] 36 °C (96.8 °F)  Pulse:  [55-92] 58  Resp:  [16-24] 16  BP: (121-160)/(70-94) 131/71  SpO2:  [90 %-97 %] 95 %    Physical Exam  Vitals and nursing note reviewed.   Constitutional:       Appearance: Normal appearance.   HENT:      Head: Normocephalic and atraumatic.      Nose: Nose normal.      Mouth/Throat:      Pharynx: Oropharynx is clear.   Eyes:      Extraocular Movements:  Extraocular movements intact.      Conjunctiva/sclera: Conjunctivae normal.      Pupils: Pupils are equal, round, and reactive to light.   Cardiovascular:      Rate and Rhythm: Normal rate and regular rhythm.      Pulses: Normal pulses.      Heart sounds: Normal heart sounds.   Pulmonary:      Effort: Pulmonary effort is normal. No respiratory distress.      Breath sounds: Normal breath sounds. No wheezing.   Abdominal:      General: Abdomen is flat. Bowel sounds are normal. There is no distension.      Palpations: Abdomen is soft.      Tenderness: There is no abdominal tenderness.   Musculoskeletal:         General: Normal range of motion.      Cervical back: Normal range of motion and neck supple.   Skin:     General: Skin is warm and dry.   Neurological:      General: No focal deficit present.      Mental Status: She is alert and oriented to person, place, and time. Mental status is at baseline.   Psychiatric:         Mood and Affect: Mood normal.         Behavior: Behavior normal.         Fluids    Intake/Output Summary (Last 24 hours) at 10/28/2023 1219  Last data filed at 10/28/2023 1120  Gross per 24 hour   Intake 1020 ml   Output --   Net 1020 ml       Laboratory  Recent Labs     10/27/23  1452 10/28/23  0843   WBC 15.1* 17.7*   RBC 3.54* 3.55*   HEMOGLOBIN 9.7* 9.7*   HEMATOCRIT 29.3* 29.3*   MCV 82.8 82.5   MCH 27.4 27.3   MCHC 33.1 33.1   RDW 47.8 48.0   PLATELETCT 178 218   MPV 9.4 9.6     Recent Labs     10/27/23  1452 10/28/23  0843   SODIUM 135 133*   POTASSIUM 3.4* 4.0   CHLORIDE 107 103   CO2 18* 20   GLUCOSE 111* 121*   BUN 16 21   CREATININE 1.54* 1.22   CALCIUM 8.1* 8.1*                   Imaging  MR-FOREIGN FILM MRI   Final Result      DX-CYSTO FLUORO > 1 HOUR    (Results Pending)        Assessment/Plan  * Hydronephrosis- (present on admission)  Assessment & Plan  With associated right-sided kidney stone and urinary tract infection  Urology consulted, s/p cystoscopy with ureteral stent placement  on 10/27.   Urology rec outpatient CULTS in 2-3 weeks once infection has resolved.  Continue iv ceftriaxone      Hyponatremia  Assessment & Plan  Mild, continue to monitor    Acute renal failure (ARF) (HCC)  Assessment & Plan  In setting of nephrolithiasis, hydronephrosis UTI and bacteremia  I have ordered urinalysis  Improved since admission to outside hospital 4 days ago  Continue to monitor kidney function  Avoid nephrotoxins, renally dose all meds  Improving      Bacteremia  Assessment & Plan  Klebsiella growing on blood culture at outside hospital  Source is urine/kidney stone  Have redrawn blood cultures here 1/2 positive for G+cocci  ID consulted, continue iv ceftriaxone         VTE prophylaxis:    enoxaparin ppx      I have performed a physical exam and reviewed and updated ROS and Plan today (10/28/2023). In review of yesterday's note (10/27/2023), there are no changes except as documented above.    Patient is has a high medical complexity, complex decision making and is at high risk for complication, morbidity, and mortality.    My total time spent caring for the patient on the day of the encounter was 52 minutes.   This does not include time spent on separately billable procedures/tests.

## 2023-10-28 NOTE — CONSULTS
Consults  INFECTIOUS DISEASES INPATIENT CONSULT NOTE     Date of Service: 10/28/2023    Consult Requested By: Bettina Ramos M.D.    Reason for Consultation: Bacteremia    History of Present Illness:   Ingrid Bartlett is a 53 y.o.  admitted 10/27/2023. Pt has a past medical history of right flank pain ongoing for 4 to 5 days.  She initially presented to [facility] and on initial work-up was found to have an AYAKA with CT of the abdomen pelvis showing right-sided kidney stone with hydronephrosis.  Blood cultures were sent and report is positive for Klebsiella, unknown species.  She was then transferred to [facility] for urology.    Hospital Course:   She has been afebrile, leukocytosis ongoing.  She went to the OR with urology on 10/27 for cystoscopy with finding of normal urethra and bladder stone noted.  No sign of hydronephrosis, stent placed.    Review Of Systems:  Review of Systems   Constitutional:  Negative for chills, fever and malaise/fatigue.   HENT:  Negative for hearing loss.    Eyes:  Negative for blurred vision.   Respiratory:  Negative for cough, sputum production and shortness of breath.    Cardiovascular:  Negative for chest pain.   Gastrointestinal:  Negative for abdominal pain, constipation, diarrhea, nausea and vomiting.   Genitourinary:  Positive for dysuria. Negative for flank pain, frequency, hematuria and urgency.   Musculoskeletal:  Negative for myalgias.   Skin:  Negative for rash.   Neurological:  Negative for focal weakness.   Psychiatric/Behavioral:  The patient is not nervous/anxious.        PMH:   History reviewed. No pertinent past medical history.    PSH:  History reviewed. No pertinent surgical history.    FAMILY HX:  History reviewed. No pertinent family history.  Reviewed family history. No pertinent family history.     SOCIAL HX:  Social History     Socioeconomic History    Marital status: Single     Spouse name: Not on file    Number of children: Not on file    Years of education:  "Not on file    Highest education level: Not on file   Occupational History    Not on file   Tobacco Use    Smoking status: Not on file    Smokeless tobacco: Not on file   Substance and Sexual Activity    Alcohol use: Not on file    Drug use: Not on file    Sexual activity: Not on file   Other Topics Concern    Not on file   Social History Narrative    Not on file     Social Determinants of Health     Financial Resource Strain: Not on file   Food Insecurity: Not on file   Transportation Needs: Not on file   Physical Activity: Not on file   Stress: Not on file   Social Connections: Not on file   Intimate Partner Violence: Not on file   Housing Stability: Not on file     Social History     Tobacco Use   Smoking Status Not on file   Smokeless Tobacco Not on file     Social History     Substance and Sexual Activity   Alcohol Use None       Allergies/Intolerances:  No Known Allergies    History reviewed with the patient and /or family member, chart & primary care team    Other Current Medications:    Current Facility-Administered Medications:     Pharmacy Consult Request ...Pain Management Review 1 Each, 1 Each, Other, PHARMACY TO DOSE, Sandeep Rubio M.D.    acetaminophen (Tylenol) tablet 650 mg, 650 mg, Oral, Q6HRS **FOLLOWED BY** [START ON 11/1/2023] acetaminophen (Tylenol) tablet 650 mg, 650 mg, Oral, Q6HRS PRN, Sandeep Rubio M.D.    oxyCODONE immediate-release (Roxicodone) tablet 5 mg, 5 mg, Oral, Q3HRS PRN **OR** oxyCODONE immediate release (Roxicodone) tablet 10 mg, 10 mg, Oral, Q3HRS PRN, 10 mg at 10/27/23 1534 **OR** HYDROmorphone (Dilaudid) injection 0.5 mg, 0.5 mg, Intravenous, Q3HRS PRN, Sandeep Rubio M.D.    cefTRIAXone (Rocephin) syringe 2,000 mg, 2,000 mg, Intravenous, Q24HRS, Sandeep Rubio M.D., 2,000 mg at 10/28/23 0515  [unfilled]    Most Recent Vital Signs:  /71   Pulse (!) 58   Temp 36 °C (96.8 °F) (Temporal)   Resp 16   Ht 1.651 m (5' 5\")   Wt 85.8 kg (189 lb 2.5 " oz)   SpO2 95%   BMI 31.48 kg/m²   Temp  Av.1 °C (96.9 °F)  Min: 35.6 °C (96 °F)  Max: 37.2 °C (98.9 °F)    Physical Exam:  Physical Exam  Constitutional:       Appearance: Normal appearance.   HENT:      Right Ear: External ear normal.      Left Ear: External ear normal.      Nose: Nose normal.      Mouth/Throat:      Mouth: Mucous membranes are dry.      Pharynx: Oropharynx is clear.   Eyes:      Conjunctiva/sclera: Conjunctivae normal.      Pupils: Pupils are equal, round, and reactive to light.   Cardiovascular:      Rate and Rhythm: Normal rate.      Heart sounds: Normal heart sounds.   Pulmonary:      Effort: Pulmonary effort is normal.      Breath sounds: Normal breath sounds.   Abdominal:      General: Abdomen is flat. Bowel sounds are normal. There is no distension.      Palpations: Abdomen is soft.      Tenderness: There is no abdominal tenderness. There is no right CVA tenderness or left CVA tenderness.   Musculoskeletal:         General: Normal range of motion.      Cervical back: Normal range of motion and neck supple.   Skin:     General: Skin is warm and dry.   Neurological:      General: No focal deficit present.      Mental Status: She is alert and oriented to person, place, and time.   Psychiatric:         Mood and Affect: Mood normal.         Behavior: Behavior normal.           Pertinent Lab Results:  Recent Labs     10/27/23  1452 10/28/23  0843   WBC 15.1* 17.7*      Recent Labs     10/27/23  1452 10/28/23  0843   HEMOGLOBIN 9.7* 9.7*   HEMATOCRIT 29.3* 29.3*   MCV 82.8 82.5   MCH 27.4 27.3   ANISOCYTOSIS 1+  --    PLATELETCT 178 218         Recent Labs     10/27/23  1452 10/28/23  0843   SODIUM 135 133*   POTASSIUM 3.4* 4.0   CHLORIDE 107 103   CO2 18* 20   CREATININE 1.54* 1.22        Recent Labs     10/27/23  1452 10/28/23  0843   ALBUMIN 2.1* 2.2*        Pertinent Micro:  Results       Procedure Component Value Units Date/Time    BLOOD CULTURE [[id number]]  (Abnormal) Collected:  "10/27/23 1452    Order Status: Completed Specimen: Blood from Peripheral Updated: 10/28/23 1140     Significant Indicator POS     Source BLD     Site PERIPHERAL     Culture Result Growth detected by Bactec instrument. 10/28/2023  10:02  Gram Stain: Gram positive cocci: Possible Staphylococcus sp.  Negative for Staphylococcus aureus and MRSA by PCR. Correlate  ongoing need for antibiotics with clinical condition.  Further report to follow.      Narrative:      CALL  [place]  MS5 tel. [phone],  CALLED  MS5 tel. [phone] 10/28/2023, 11:36, RB PERF. RESULTS CALLED  TO:Martina Pascal RN  Per Hospital Policy: Only change Specimen Src: to \"Line\" if  specified by physician order.  Release to patient->Immediate  Right Hand    URINE CULTURE-EXISTING-LESS THAN 48 HOURS [[id number]] Collected: 10/27/23 0804    Order Status: Sent Specimen: Urine, Clean Catch Updated: 10/28/23 0804    Narrative:      Indication for culture:->Patient WITHOUT an indwelling Ortiz  catheter in place with new onset of Dysuria, Frequency,  Urgency, and/or Suprapubic pain  Release to patient->Immediate    BLOOD CULTURE [[id number]] Collected: 10/27/23 1452    Order Status: Completed Specimen: Blood from Peripheral Updated: 10/28/23 0724     Significant Indicator NEG     Source BLD     Site PERIPHERAL     Culture Result No Growth  Note: Blood cultures are incubated for 5 days and  are monitored continuously.Positive blood cultures  are called to the RN and reported as soon as  they are identified.      Narrative:      Per Hospital Policy: Only change Specimen Src: to \"Line\" if  specified by physician order.  Release to patient->Immediate  Right AC    URINALYSIS [[id number]]  (Abnormal) Collected: 10/27/23 1620    Order Status: Completed Specimen: Urine, Clean Catch Updated: 10/27/23 1646     Color DK Yellow     Character Clear     Specific Gravity 1.010     Ph 6.0     Glucose Negative mg/dL      Ketones Negative mg/dL      Protein 30 mg/dL      Bilirubin " "Negative     Urobilinogen, Urine 1.0     Nitrite Negative     Leukocyte Esterase Small     Occult Blood Large     Micro Urine Req Microscopic    Narrative:      Collected By: BILLY BOND  Release to patient->Immediate          No results found for: \"BLOODCULTU\", \"BLDCULT\", \"BCHOLD\"     Studies:  No results found.    ASSESSMENT/PLAN:     53 y.o.  admitted 10/27/2023. Pt has a past medical history of right flank pain ongoing for 4 to 5 days.  She initially presented to [facility] and on initial work-up was found to have an AYAKA with CT of the abdomen pelvis showing right-sided kidney stone with hydronephrosis.  Blood cultures were sent and report is positive for Klebsiella. She was then transferred to [facility] for urology.    Hospital Course:   She has been afebrile, leukocytosis ongoing.  She went to the OR with urology on 10/27 for cystoscopy with finding of normal urethra and bladder stone noted.  No sign of hydronephrosis, stent placed.  Per patient this will likely be removed in a few weeks but no definitive date.    Problem List    Leukocytosis  Bacteremia  -Blood cultures at [facility] reported as Klebsiella pneumoniae per verbal and   Pansensitive, requested fax of the culture results and will have uploaded into epic under media tab on Tuesday  -Blood cultures on 10/27 with 1 of 2 coag negative staph, likely contaminant  Pyelonephritis  Nephrolithiasis  Concern for obstructing stone  -Endoscopy with urology on 10/27, stent placed  AYAKA, improving    Assessment:      -Notes were reviewed from primary team, radiology, ED, surgery, specialists, etc.   -Reviewed labs to date, microbiology for current admit and prior  -Imaging was independently reviewed and interpreted    Plan:    Recommendations for antibiotics:   --- Continue ceftriaxone for now-monitor for another day and then if she continues to improve tomorrow and blood cultures remain negative can likely discharge on " fluoroquinolone    Recommendations for further/ongoing work-up, monitoring of clinical status and drug toxicity:   --- Follow-up blood cultures  --- Please obtain an EKG    Recommendations for intervention:   --- Recommend stent removal while still under cover of antibiotics if feasible, no plans to extend antibiotics beyond course as above        Dispo: Awaiting culture results and work-up as above.  Patient is at risk for infectious complications including death, sepsis and organ dysfunction.    PICC: TBD, anticipate will have oral options      Plan of care discussed with EMMANUEL Ramos M.D.. Will continue to follow    Hafsa Tabares M.D.

## 2023-10-29 ENCOUNTER — PHARMACY VISIT (OUTPATIENT)
Dept: PHARMACY | Facility: MEDICAL CENTER | Age: 53
End: 2023-10-29
Payer: COMMERCIAL

## 2023-10-29 VITALS
DIASTOLIC BLOOD PRESSURE: 80 MMHG | HEIGHT: 65 IN | BODY MASS INDEX: 35.78 KG/M2 | OXYGEN SATURATION: 93 % | HEART RATE: 65 BPM | RESPIRATION RATE: 18 BRPM | SYSTOLIC BLOOD PRESSURE: 141 MMHG | TEMPERATURE: 97 F | WEIGHT: 214.73 LBS

## 2023-10-29 LAB
ANION GAP SERPL CALC-SCNC: 9 MMOL/L (ref 7–16)
BACTERIA BLD CULT: ABNORMAL
BACTERIA BLD CULT: ABNORMAL
BUN SERPL-MCNC: 18 MG/DL (ref 8–22)
CALCIUM SERPL-MCNC: 8.1 MG/DL (ref 8.5–10.5)
CHLORIDE SERPL-SCNC: 108 MMOL/L (ref 96–112)
CO2 SERPL-SCNC: 23 MMOL/L (ref 20–33)
CREAT SERPL-MCNC: 1.23 MG/DL (ref 0.5–1.4)
EKG IMPRESSION: NORMAL
ERYTHROCYTE [DISTWIDTH] IN BLOOD BY AUTOMATED COUNT: 47.9 FL (ref 35.9–50)
GFR SERPLBLD CREATININE-BSD FMLA CKD-EPI: 52 ML/MIN/1.73 M 2
GLUCOSE SERPL-MCNC: 90 MG/DL (ref 65–99)
HCT VFR BLD AUTO: 28.1 % (ref 37–47)
HGB BLD-MCNC: 9.4 G/DL (ref 12–16)
MCH RBC QN AUTO: 27.6 PG (ref 27–33)
MCHC RBC AUTO-ENTMCNC: 33.5 G/DL (ref 32.2–35.5)
MCV RBC AUTO: 82.6 FL (ref 81.4–97.8)
PLATELET # BLD AUTO: 243 K/UL (ref 164–446)
PMV BLD AUTO: 9.1 FL (ref 9–12.9)
POTASSIUM SERPL-SCNC: 4 MMOL/L (ref 3.6–5.5)
RBC # BLD AUTO: 3.4 M/UL (ref 4.2–5.4)
SIGNIFICANT IND 70042: ABNORMAL
SITE SITE: ABNORMAL
SODIUM SERPL-SCNC: 140 MMOL/L (ref 135–145)
SOURCE SOURCE: ABNORMAL
WBC # BLD AUTO: 16.6 K/UL (ref 4.8–10.8)

## 2023-10-29 PROCEDURE — 93010 ELECTROCARDIOGRAM REPORT: CPT | Performed by: INTERNAL MEDICINE

## 2023-10-29 PROCEDURE — 87040 BLOOD CULTURE FOR BACTERIA: CPT | Mod: 91

## 2023-10-29 PROCEDURE — 36415 COLL VENOUS BLD VENIPUNCTURE: CPT

## 2023-10-29 PROCEDURE — 99239 HOSP IP/OBS DSCHRG MGMT >30: CPT | Performed by: STUDENT IN AN ORGANIZED HEALTH CARE EDUCATION/TRAINING PROGRAM

## 2023-10-29 PROCEDURE — A9270 NON-COVERED ITEM OR SERVICE: HCPCS | Performed by: STUDENT IN AN ORGANIZED HEALTH CARE EDUCATION/TRAINING PROGRAM

## 2023-10-29 PROCEDURE — 700111 HCHG RX REV CODE 636 W/ 250 OVERRIDE (IP): Performed by: STUDENT IN AN ORGANIZED HEALTH CARE EDUCATION/TRAINING PROGRAM

## 2023-10-29 PROCEDURE — RXMED WILLOW AMBULATORY MEDICATION CHARGE: Performed by: STUDENT IN AN ORGANIZED HEALTH CARE EDUCATION/TRAINING PROGRAM

## 2023-10-29 PROCEDURE — 99232 SBSQ HOSP IP/OBS MODERATE 35: CPT | Performed by: INTERNAL MEDICINE

## 2023-10-29 PROCEDURE — 80048 BASIC METABOLIC PNL TOTAL CA: CPT

## 2023-10-29 PROCEDURE — 85027 COMPLETE CBC AUTOMATED: CPT

## 2023-10-29 PROCEDURE — 700102 HCHG RX REV CODE 250 W/ 637 OVERRIDE(OP): Performed by: STUDENT IN AN ORGANIZED HEALTH CARE EDUCATION/TRAINING PROGRAM

## 2023-10-29 PROCEDURE — 93005 ELECTROCARDIOGRAM TRACING: CPT | Performed by: STUDENT IN AN ORGANIZED HEALTH CARE EDUCATION/TRAINING PROGRAM

## 2023-10-29 RX ORDER — ENOXAPARIN SODIUM 100 MG/ML
40 INJECTION SUBCUTANEOUS DAILY
Status: DISCONTINUED | OUTPATIENT
Start: 2023-10-29 | End: 2023-10-29 | Stop reason: HOSPADM

## 2023-10-29 RX ORDER — CIPROFLOXACIN 500 MG/1
500 TABLET, FILM COATED ORAL 2 TIMES DAILY
Qty: 26 TABLET | Refills: 0 | Status: ACTIVE | OUTPATIENT
Start: 2023-10-29 | End: 2023-11-11

## 2023-10-29 RX ADMIN — ACETAMINOPHEN 650 MG: 325 TABLET, FILM COATED ORAL at 05:26

## 2023-10-29 RX ADMIN — CEFTRIAXONE SODIUM 2000 MG: 10 INJECTION, POWDER, FOR SOLUTION INTRAVENOUS at 05:26

## 2023-10-29 ASSESSMENT — LIFESTYLE VARIABLES
HOW MANY TIMES IN THE PAST YEAR HAVE YOU HAD 5 OR MORE DRINKS IN A DAY: 0
EVER FELT BAD OR GUILTY ABOUT YOUR DRINKING: NO
DOES PATIENT WANT TO STOP DRINKING: NO
AVERAGE NUMBER OF DAYS PER WEEK YOU HAVE A DRINK CONTAINING ALCOHOL: 0
ON A TYPICAL DAY WHEN YOU DRINK ALCOHOL HOW MANY DRINKS DO YOU HAVE: 0
TOTAL SCORE: 0
HAVE PEOPLE ANNOYED YOU BY CRITICIZING YOUR DRINKING: NO
CONSUMPTION TOTAL: NEGATIVE
TOTAL SCORE: 0
TOTAL SCORE: 0
ALCOHOL_USE: NO
EVER HAD A DRINK FIRST THING IN THE MORNING TO STEADY YOUR NERVES TO GET RID OF A HANGOVER: NO
HAVE YOU EVER FELT YOU SHOULD CUT DOWN ON YOUR DRINKING: NO

## 2023-10-29 ASSESSMENT — COGNITIVE AND FUNCTIONAL STATUS - GENERAL
SUGGESTED CMS G CODE MODIFIER DAILY ACTIVITY: CI
WALKING IN HOSPITAL ROOM: A LITTLE
MOBILITY SCORE: 22
DAILY ACTIVITIY SCORE: 23
CLIMB 3 TO 5 STEPS WITH RAILING: A LITTLE
TOILETING: A LITTLE
SUGGESTED CMS G CODE MODIFIER MOBILITY: CJ

## 2023-10-29 ASSESSMENT — PATIENT HEALTH QUESTIONNAIRE - PHQ9
SUM OF ALL RESPONSES TO PHQ9 QUESTIONS 1 AND 2: 0
2. FEELING DOWN, DEPRESSED, IRRITABLE, OR HOPELESS: NOT AT ALL
1. LITTLE INTEREST OR PLEASURE IN DOING THINGS: NOT AT ALL

## 2023-10-29 ASSESSMENT — ENCOUNTER SYMPTOMS
VOMITING: 0
ABDOMINAL PAIN: 0
FLANK PAIN: 0
CONSTIPATION: 0
MYALGIAS: 0
NAUSEA: 0
WEAKNESS: 0
COUGH: 0
NERVOUS/ANXIOUS: 0
DIARRHEA: 0
SHORTNESS OF BREATH: 0

## 2023-10-29 ASSESSMENT — FIBROSIS 4 INDEX: FIB4 SCORE: 1.01

## 2023-10-29 ASSESSMENT — PAIN DESCRIPTION - PAIN TYPE
TYPE: ACUTE PAIN
TYPE: ACUTE PAIN

## 2023-10-29 NOTE — PROGRESS NOTES
"Received alert and oriented x 4. Check vitals sign and recorded accordingly and due med given per MAR. Monitor sign and symptoms of respiratory distress and treatment given accordingly per MAR.Medicated per MAR and reassessed every 2 hours per protocol. Call light within reach. Steady on her feet. Needs attended. Continue to monitor./74   Pulse 86   Temp 37 °C (98.6 °F) (Temporal)   Resp 17   Ht 1.651 m (5' 5\")   Wt 97.4 kg (214 lb 11.7 oz)   SpO2 93%   BMI 35.73 kg/m² . Encouraged to increase fluid intake for burning urination.  "

## 2023-10-29 NOTE — PROGRESS NOTES
Prescribed medications delivered to bedside. Discharge instructions provided to patient. All questions/ concerns addressed. Verbalized understanding of teaching. PIV removed. Copy provided to patient, signed copy scanned to [facility]. Pt ambulated off unit with significant other without incident.

## 2023-10-29 NOTE — DISCHARGE INSTRUCTIONS
Discharge Instructions per Bettina Ramos M.D.    Starting tomorrow, please take antibiotics Ciprofloxacin 500mg twice a day till 11/11/23. This medication should not be taken with multi vitamins.  Monitor blood sugars closely if diabetic. Discuss medication with health care professional if new joint or tendon pain. Seek medical attention if experience multiple watery bowel movements.     Follow-up with urology as outpatient in 2-3 weeks for outpatient CULTS once infection resolves    Return to ER in the event of new or worsening symptoms. Please note importance of compliance and the patient has agreed to proceed with all medical recommendations and follow up plan indicated above. All medications come with benefits and risks. Risks may include permanent injury or death and these risks can be minimized with close reassessment and monitoring. Please make it to your scheduled follow ups with PCP and [place]rology [place]

## 2023-10-29 NOTE — PROGRESS NOTES
Infectious Disease Progress Note    Author: Hafsa Tabares M.D. Date & Time of service: 10/29/2023  11:02 AM    Chief Complaint:  Bacteremia    Interval History:    Review of Systems:  Review of Systems   Respiratory:  Negative for cough and shortness of breath.    Gastrointestinal:  Negative for abdominal pain, constipation, diarrhea, nausea and vomiting.   Genitourinary:  Negative for dysuria, flank pain, frequency, hematuria and urgency.   Musculoskeletal:  Negative for joint pain and myalgias.   Neurological:  Negative for weakness.   Psychiatric/Behavioral:  The patient is not nervous/anxious.        Hemodynamics:  Temp (24hrs), Av.4 °C (97.6 °F), Min:36.1 °C (97 °F), Max:37 °C (98.6 °F)  Temperature: 36.1 °C (97 °F)  Pulse  Av.4  Min: 55  Max: 92   Blood Pressure: (!) 141/80       Physical Exam:  Physical Exam  Cardiovascular:      Rate and Rhythm: Normal rate and regular rhythm.      Heart sounds: Normal heart sounds.   Pulmonary:      Effort: Pulmonary effort is normal.      Breath sounds: Normal breath sounds.   Abdominal:      General: Abdomen is flat. Bowel sounds are normal. There is no distension.      Palpations: Abdomen is soft.      Tenderness: There is no abdominal tenderness. There is no right CVA tenderness or left CVA tenderness.   Musculoskeletal:         General: Normal range of motion.   Skin:     General: Skin is warm and dry.   Neurological:      General: No focal deficit present.      Mental Status: She is oriented to person, place, and time.   Psychiatric:         Mood and Affect: Mood normal.         Behavior: Behavior normal.         Meds:    Current Facility-Administered Medications:     enoxaparin (LOVENOX) injection    influenza vaccine quad    senna-docusate **AND** polyethylene glycol/lytes **AND** magnesium hydroxide **AND** bisacodyl    Pharmacy Consult Request    acetaminophen **FOLLOWED BY** [START ON 2023] acetaminophen    oxyCODONE immediate-release **OR**  "oxyCODONE immediate-release **OR** HYDROmorphone    cefTRIAXone (ROCEPHIN) IV    Labs:  Recent Labs     10/27/23  1452 10/28/23  0843 10/29/23  0253   WBC 15.1* 17.7* 16.6*   RBC 3.54* 3.55* 3.40*   HEMOGLOBIN 9.7* 9.7* 9.4*   HEMATOCRIT 29.3* 29.3* 28.1*   MCV 82.8 82.5 82.6   MCH 27.4 27.3 27.6   RDW 47.8 48.0 47.9   PLATELETCT 178 218 243   MPV 9.4 9.6 9.1   NEUTSPOLYS 73.80*  --   --    LYMPHOCYTES 13.90*  --   --    MONOCYTES 0.80  --   --    EOSINOPHILS 0.00  --   --    BASOPHILS 0.00  --   --    RBCMORPHOLO Present  --   --      Recent Labs     10/27/23  1452 10/28/23  0843 10/29/23  0253   SODIUM 135 133* 140   POTASSIUM 3.4* 4.0 4.0   CHLORIDE 107 103 108   CO2 18* 20 23   GLUCOSE 111* 121* 90   BUN 16 21 18     Recent Labs     10/27/23  1452 10/28/23  0843 10/29/23  0253   ALBUMIN 2.1* 2.2*  --    TBILIRUBIN 2.6* 1.5  --    ALKPHOSPHAT 302* 280*  --    TOTPROTEIN 5.8* 5.9*  --    ALTSGPT 21 16  --    ASTSGOT 30 16  --    CREATININE 1.54* 1.22 1.23       Imaging:  No results found.    Micro:  Results       Procedure Component Value Units Date/Time    BLOOD CULTURE [[id number]]  (Abnormal) Collected: 10/27/23 1452    Order Status: Completed Specimen: Blood from Peripheral Updated: 10/29/23 0937     Significant Indicator POS     Source BLD     Site PERIPHERAL     Culture Result Growth detected by Bactec instrument. 10/28/2023  10:02  Negative for Staphylococcus aureus and MRSA by PCR. Correlate  ongoing need for antibiotics with clinical condition.        Coagulase-negative Staphylococcus species  Possible Contaminant  Isolated from one set only, please correlate with clinical  condition. Contact the Microbiology department within 48 hr  if identification and susceptibility are needed.      Narrative:      CALL  [place]  MS5 tel. [phone],  CALLED  MS5 tel. [phone] 10/28/2023, 11:44, RB PERF. RESULTS CALLED  TO:Erich Houser Pharm D via voalte  Per Hospital Policy: Only change Specimen Src: to \"Line\" " "if  specified by physician order.  Release to patient->Immediate  Right Hand    BLOOD CULTURE [[id number]] Collected: 10/29/23 0833    Order Status: Sent Specimen: Blood from Peripheral Updated: 10/29/23 0904    Narrative:      Per Hospital Policy: Only change Specimen Src: to \"Line\" if  specified by physician order.  Release to patient->Immediate    BLOOD CULTURE [[id number]] Collected: 10/29/23 0833    Order Status: Sent Specimen: Blood from Peripheral Updated: 10/29/23 0904    Narrative:      Per Hospital Policy: Only change Specimen Src: to \"Line\" if  specified by physician order.  Release to patient->Immediate    URINE CULTURE-EXISTING-LESS THAN 48 HOURS [[id number]] Collected: 10/27/23 0804    Order Status: Sent Specimen: Urine, Clean Catch Updated: 10/28/23 0804    Narrative:      Indication for culture:->Patient WITHOUT an indwelling Ortiz  catheter in place with new onset of Dysuria, Frequency,  Urgency, and/or Suprapubic pain  Release to patient->Immediate    BLOOD CULTURE [[id number]] Collected: 10/27/23 1452    Order Status: Completed Specimen: Blood from Peripheral Updated: 10/28/23 0724     Significant Indicator NEG     Source BLD     Site PERIPHERAL     Culture Result No Growth  Note: Blood cultures are incubated for 5 days and  are monitored continuously.Positive blood cultures  are called to the RN and reported as soon as  they are identified.      Narrative:      Per Hospital Policy: Only change Specimen Src: to \"Line\" if  specified by physician order.  Release to patient->Immediate  Right AC    URINALYSIS [[id number]]  (Abnormal) Collected: 10/27/23 1620    Order Status: Completed Specimen: Urine, Clean Catch Updated: 10/27/23 1646     Color DK Yellow     Character Clear     Specific Gravity 1.010     Ph 6.0     Glucose Negative mg/dL      Ketones Negative mg/dL      Protein 30 mg/dL      Bilirubin Negative     Urobilinogen, Urine 1.0     Nitrite Negative     Leukocyte Esterase Small     Occult Blood " Large     Micro Urine Req Microscopic    Narrative:      Collected By: BILLY BOND  Release to patient->Immediate            Assessment:  Active Hospital Problems    Diagnosis     *Hydronephrosis [N13.30]     Hyponatremia [E87.1]     Bacteremia [R78.81]     Acute renal failure (ARF) (HCC) [N17.9]      Interval 24 hours:      AF, O2 RA  Labs reviewed to assess for clinical status, drug toxicity and organ function  Imaging personally reviewed both images and report.   Micro reviewed    Patient doing well today, denies any dysuria, abdominal pain or flank pain.  Continued on antibiotics as below.      ASSESSMENT/PLAN:      53 y.o.  admitted 10/27/2023. Pt has a past medical history of right flank pain ongoing for 4 to 5 days.  She initially presented to [facility] and on initial work-up was found to have an AYAKA with CT of the abdomen pelvis showing right-sided kidney stone with hydronephrosis.  Blood cultures were sent and report is positive for Klebsiella. She was then transferred to [facility] for urology.     Hospital Course:   She has been afebrile, leukocytosis ongoing.  She went to the OR with urology on 10/27 for cystoscopy with finding of normal urethra and bladder stone noted.  No sign of hydronephrosis, stent placed.  Per patient this will likely be removed in a few weeks but no definitive date.     Problem List     Leukocytosis, ongoing, improved   Bacteremia  -Blood cultures at [facility] reported as Klebsiella pneumoniae per verbal   Pansensitive, requested fax of the culture results and will have uploaded into epic under media tab on Tuesday  -Blood cultures on 10/27 with 1 of 2 coag negative staph, likely contaminant  Pyelonephritis  Nephrolithiasis  Concern for obstructing stone  -Endoscopy with urology on 10/27, stent placed  AYAKA, improved     Plan:     Recommendations for antibiotics:   --- Continue ceftriaxone while inpatient, on discharge transition to ciprofloxacin 500 mg twice daily  and will recommend a 14-day antibiotic course given pyelonephritis and stent placement, end 11/11/23      Recommendations for further/ongoing work-up, monitoring of clinical status and drug toxicity:   --- Follow-up blood cultures and repeat blood cultures to final  --- Discussed fluoroquinolone management with the patient.  Medication should not be taken with multi vitamins.  Monitor blood sugars closely if diabetic. Discuss medication with health care professional if new joint or tendon pain. Seek medical attention if experience multiple watery bowel movements.   EKG on 10/29   QTc:443, acceptable     Recommendations for intervention:   --- Recommend stent removal while still under cover of antibiotics if feasible    --- Follow-up with urology        Dispo: Okay for discharge from ID perspective      Plan of care discussed with EMMANUEL Ramos M.D.. ID will sign off.      Hafsa Tabares M.D.

## 2023-10-29 NOTE — CARE PLAN
The patient is Stable - Low risk of patient condition declining or worsening    Shift Goals  Clinical Goals: Safety  Patient Goals: Remain updated on POC    Progress made toward(s) clinical / shift goals:     Problem: Pain - Standard  Goal: Alleviation of pain or a reduction in pain to the patient’s comfort goal  Outcome: Progressing  Flowsheets (Taken 10/29/2023 2135)  Pain Rating Scale (NPRS): 0  Note: Provided extra pillows/ blankets for support/ repositioning. Will continue to assess/ treat accordingly.     Problem: Fall Risk  Goal: Patient will remain free from falls  Outcome: Progressing  Note: Appropriate fall precautions in place.

## 2023-10-29 NOTE — CARE PLAN
The patient is Stable - Low risk of patient condition declining or worsening    Shift Goals  Clinical Goals: comfort and less burning urination, encouraged increase fluid intake  Patient Goals: rest and sleep  Family Goals: NICKY    Progress made toward(s) clinical / shift goals:  Less burning urination at the end of the shift and provide restful night.      Problem: Knowledge Deficit - Standard  Goal: Patient and family/care givers will demonstrate understanding of plan of care, disease process/condition, diagnostic tests and medications  Outcome: Progressing     Problem: Pain - Standard  Goal: Alleviation of pain or a reduction in pain to the patient’s comfort goal  Outcome: Progressing

## 2023-10-29 NOTE — DISCHARGE SUMMARY
Discharge Summary    CHIEF COMPLAINT ON ADMISSION  No chief complaint on file.      Reason for Admission  Hydronephrosis     Admission Date  10/27/2023    CODE STATUS  Full Code    HPI & HOSPITAL COURSE  53 y.o. female who presented 10/27/2023 with flank pain, kidney stone. Patient was transferred from outside hospital. She was noted AYAKA and CT abdomen showed right hydronephrosis with kidney stone with questionable cholecystitis. MRCP negative. Blood culture positive for Klebsiella. She is transferred here for urology evaluation.   Here urology was consulted. She underwent cystoscopy with ureteral stent placement on 10/27. Urology rec outpatient CULTS in 2-3 weeks once infection has resolved.   ID was consulted for Klebsiella bacteremia. She was on ceftriaxone. Repeated culture on 10/27 was 1 of 2 coag negative staph, likely contaminant.  ID recommends on discharge transition to ciprofloxacin 500 mg twice daily for 14-day antibiotic course with ending date 11/11/2023. She is advised that ciprofloxacin should not be taken with multi vitamins.  Monitor blood sugars closely if diabetic. Discuss medication with health care professional if new joint or tendon pain. Seek medical attention if experience multiple watery bowel movements.  Patient voiced understanding.  For AYAKA, patient received IV fluid.  She underwent urological intervention on 10/27.  AYAKA has been resolved.    Meds to bed arranged.    Therefore, she is discharged in good and stable condition to home with close outpatient follow-up.  He is advised to follow-up with PCP in 1 week and to follow-up with urology for CULTS in 2-3 weeks once infection has resolved.    The patient met 2-midnight criteria for an inpatient stay at the time of discharge.    Discharge Date  10/29/23    FOLLOW UP ITEMS POST DISCHARGE  PCP  Urology    DISCHARGE DIAGNOSES  Principal Problem:    Hydronephrosis (POA: Yes)  Active Problems:    Bacteremia (POA: Unknown)    Acute renal  failure (ARF) (HCC) (POA: Unknown)    Hyponatremia (POA: Unknown)  Resolved Problems:    * No resolved hospital problems. *      FOLLOW UP  No future appointments.  [facility]  [address] [place] [address] [phone]    Follow up in 2 week(s)        MEDICATIONS ON DISCHARGE     Medication List        START taking these medications        Instructions   ciprofloxacin 500 MG Tabs  Commonly known as: Cipro   Take 1 Tablet by mouth 2 times a day for 13 days.  Dose: 500 mg              Allergies  No Known Allergies    DIET  Orders Placed This Encounter   Procedures    Diet Order Diet: Regular     Standing Status:   Standing     Number of Occurrences:   1     Order Specific Question:   Diet:     Answer:   Regular [1]       ACTIVITY  As tolerated.  Weight bearing as tolerated    CONSULTATIONS  ID  urology    PROCEDURES  S/p  CYSTOSCOPY, WITH URETERAL STENT INSERTION 10/27/23    LABORATORY  Lab Results   Component Value Date    SODIUM 140 10/29/2023    POTASSIUM 4.0 10/29/2023    CHLORIDE 108 10/29/2023    CO2 23 10/29/2023    GLUCOSE 90 10/29/2023    BUN 18 10/29/2023    CREATININE 1.23 10/29/2023        Lab Results   Component Value Date    WBC 16.6 (H) 10/29/2023    HEMOGLOBIN 9.4 (L) 10/29/2023    HEMATOCRIT 28.1 (L) 10/29/2023    PLATELETCT 243 10/29/2023      DX-CYSTO FLUORO > 1 HOUR   Preliminary Result      Cysto fluoroscopy utilized for 3 minutes 49 seconds.         INTERPRETING LOCATION: [address] [place] [address] [place], [address]      MR-FOREIGN FILM MRI   Final Result          Total time of the discharge process 36 minutes.

## 2023-10-30 ENCOUNTER — TELEPHONE (OUTPATIENT)
Dept: HEALTH INFORMATION MANAGEMENT | Facility: OTHER | Age: 53
End: 2023-10-30
Payer: COMMERCIAL

## 2023-10-30 LAB
BACTERIA UR CULT: NORMAL
SIGNIFICANT IND 70042: NORMAL
SITE SITE: NORMAL
SOURCE SOURCE: NORMAL

## 2023-10-31 NOTE — DOCUMENTATION QUERY
[facility]                                                                       Query Response Note      PATIENT:               MAXX ANDERSON  ACCT #:                  [id number]  MRN:                     [id number]  :                      1970  ADMIT DATE:       10/27/2023 2:03 PM  DISCH DATE:        10/29/2023 1:52 PM  RESPONDING  PROVIDER #:        [id number]           QUERY TEXT:    Sepsis is documented in the medical record based on the above findings and your workup please specify the status of sepsis.      The patient's Clinical Indicators include:  Findings: 10/27 HP:  increasing right flank pain and increasing leukocytosis with WBC increasing from 9 to 13-15 transferred for urology needs     10/27 Epic vitals 98.3-150/83-84-18-95% wbc 15.1 cre 1.54    10/27 Consult: right ureteral stone, sepsis, Once sepsis resolves, plan for ureteroscopy, laser lithotripsy 2-3 weeks    Treatment: IV antibiotics, Infectious disease consult stent insertion    Risk Factors: bacteremia, kidney stone with hydronephrosis  pyelonephritis     [email or web address] RN BSN  Clinical Documentation   [email or web address]  Connect via [email or web address] Messenger  Options provided:   -- Sepsis is ruled out, resolved prior to transfer, bacteremia only   -- Sepsis is ruled in   -- Other explanation, (please specify other explanation)      Query created by: Mini Naylor on 10/31/2023 9:55 AM    RESPONSE TEXT:    Sepsis is ruled out, resolved prior to transfer, bacteremia only          Electronically signed by:  DALTON SINGLETON MD 10/31/2023 1:52 PM

## 2023-11-01 LAB
BACTERIA BLD CULT: NORMAL
SIGNIFICANT IND 70042: NORMAL
SITE SITE: NORMAL
SOURCE SOURCE: NORMAL

## 2023-11-03 LAB
BACTERIA BLD CULT: NORMAL
BACTERIA BLD CULT: NORMAL
SIGNIFICANT IND 70042: NORMAL
SIGNIFICANT IND 70042: NORMAL
SITE SITE: NORMAL
SITE SITE: NORMAL
SOURCE SOURCE: NORMAL
SOURCE SOURCE: NORMAL

## (undated) DEVICE — GOWN SURGEONS X-LARGE - DISP. (30/CA)

## (undated) DEVICE — TOWEL STOP TIMEOUT SAFETY FLAG (40EA/CA)

## (undated) DEVICE — COVER LIGHT HANDLE ALC PLUS DISP (18EA/BX)

## (undated) DEVICE — GOWN WARMING STANDARD FLEX - (30/CA)

## (undated) DEVICE — SET IRRIGATION CYSTOSCOPY Y-TYPE L81 IN (20EA/CA)

## (undated) DEVICE — CONNECTOR HOSE NEPTUNE FOR CYSTO ROOM

## (undated) DEVICE — SLEEVE, VASO, THIGH, MED

## (undated) DEVICE — COVER FOOT UNIVERSAL DISP. - (25EA/CA)

## (undated) DEVICE — WATER IRRIG. STER 3000 ML - (4/CA)

## (undated) DEVICE — SET EXTENSION WITH 2 PORTS (48EA/CA) ***PART #2C8610 IS A SUBSTITUTE*****

## (undated) DEVICE — LACTATED RINGERS INJ 1000 ML - (14EA/CA 60CA/PF)

## (undated) DEVICE — PACK CYSTOSCOPY III - (8/CA)

## (undated) DEVICE — GLOVE BIOGEL SZ 7.5 SURGICAL PF LTX - (50PR/BX 4BX/CA)

## (undated) DEVICE — SUCTION INSTRUMENT YANKAUER BULBOUS TIP W/O VENT (50EA/CA)

## (undated) DEVICE — GOWN SURGICAL X-LARGE ULTRA - FILM-REINFORCED (20/CA)

## (undated) DEVICE — SET LEADWIRE 5 LEAD BEDSIDE DISPOSABLE ECG (1SET OF 5/EA)

## (undated) DEVICE — WIRE GUIDE SENSOR DUAL FLEX - 5/BX

## (undated) DEVICE — WATER IRRIGATION STERILE 1000ML (12EA/CA)

## (undated) DEVICE — TUBING CLEARLINK DUO-VENT - C-FLO (48EA/CA)

## (undated) DEVICE — SODIUM CHL. IRRIGATION 0.9% 3000ML (4EA/CA 65CA/PF)

## (undated) DEVICE — SPONGE GAUZESTER 4 X 4 4PLY - (128PK/CA)

## (undated) DEVICE — JELLY SURGILUBE STERILE TUBE 4.25 OZ (1/EA)

## (undated) DEVICE — CANISTER SUCTION 3000ML MECHANICAL FILTER AUTO SHUTOFF MEDI-VAC NONSTERILE LF DISP  (40EA/CA)

## (undated) DEVICE — BAG URODRAIN WITH TUBING - (20/CA)

## (undated) DEVICE — SENSOR OXIMETER ADULT SPO2 RD SET (20EA/BX)